# Patient Record
Sex: MALE | Race: BLACK OR AFRICAN AMERICAN | Employment: UNEMPLOYED | URBAN - METROPOLITAN AREA
[De-identification: names, ages, dates, MRNs, and addresses within clinical notes are randomized per-mention and may not be internally consistent; named-entity substitution may affect disease eponyms.]

---

## 2019-06-06 ENCOUNTER — TELEPHONE (OUTPATIENT)
Dept: ORTHOPEDICS | Facility: CLINIC | Age: 29
End: 2019-06-06

## 2019-06-06 NOTE — TELEPHONE ENCOUNTER
[facility]    Phone Message    May a detailed message be left on voicemail: yes    Reason for Call: Other: Valerie is the Pt's WC  who was calling in to schedule an appt for the Pt. The phone call was triaged to Ortho because of a possible fracture. Per bushra Mccauley there is no medical records and images in the Pt's chart, those will need to be faxed over first before scheduling an appt. Pt was seen on 6/5/2019 at [facility] and had XRay done there too. Valerie was informed to have those records faxed over and then we can schedule the appt for the Pt. Valerie expressed understanding and will have those records faxed over to the clinic.       Action Taken: Message routed to:  [facility] ([facility] Ortho

## 2020-05-08 ENCOUNTER — TRANSCRIBE ORDERS (OUTPATIENT)
Dept: OTHER | Age: 30
End: 2020-05-08

## 2020-05-08 DIAGNOSIS — L98.9 SKIN LESION: Primary | ICD-10-CM

## 2021-02-10 ENCOUNTER — TRANSCRIBE ORDERS (OUTPATIENT)
Dept: OTHER | Age: 31
End: 2021-02-10

## 2021-02-10 DIAGNOSIS — L98.9 SKIN LESION OF CHEST WALL: Primary | ICD-10-CM

## 2021-03-11 ENCOUNTER — OFFICE VISIT (OUTPATIENT)
Dept: DERMATOLOGY | Facility: CLINIC | Age: 31
End: 2021-03-11
Attending: INTERNAL MEDICINE
Payer: COMMERCIAL

## 2021-03-11 DIAGNOSIS — L91.0 KELOID: Primary | ICD-10-CM

## 2021-03-11 DIAGNOSIS — L21.9 DERMATITIS, SEBORRHEIC: ICD-10-CM

## 2021-03-11 DIAGNOSIS — L30.9 DERMATITIS: ICD-10-CM

## 2021-03-11 PROCEDURE — 11900 INJECT SKIN LESIONS </W 7: CPT | Mod: GC | Performed by: DERMATOLOGY

## 2021-03-11 PROCEDURE — 99203 OFFICE O/P NEW LOW 30 MIN: CPT | Mod: 25 | Performed by: DERMATOLOGY

## 2021-03-11 RX ORDER — DESONIDE 0.5 MG/G
OINTMENT TOPICAL
Qty: 15 G | Refills: 5 | Status: SHIPPED | OUTPATIENT
Start: 2021-03-11

## 2021-03-11 RX ORDER — KETOCONAZOLE 20 MG/ML
SHAMPOO TOPICAL
Qty: 120 ML | Refills: 5 | Status: SHIPPED | OUTPATIENT
Start: 2021-03-11

## 2021-03-11 ASSESSMENT — PAIN SCALES - GENERAL: PAINLEVEL: NO PAIN (0)

## 2021-03-11 NOTE — PROGRESS NOTES
Drug Administration Record    Prior to injection, verified patient identity using patient's name and date of birth.  Due to injection administration, patient instructed to remain in clinic for 15 minutes  afterwards, and to report any adverse reaction to me immediately.    Drug Name: triamcinolone acetonide(kenalog)  Dose: 1 mL of triamcinolone 40mg/mL, 40 mg dose  Route administered: ID  NDC #: Kenalog-40 ([address]-3)  Amount of waste(mL): 0  Reason for waste: As per MD    LOT #: [id number]  SITE: Chest  : [email or web address]  EXPIRATION DATE: 10/2022

## 2021-04-05 PROBLEM — L91.0 KELOID: Status: ACTIVE | Noted: 2021-04-05

## 2021-04-05 PROBLEM — L21.9 DERMATITIS, SEBORRHEIC: Status: ACTIVE | Noted: 2021-04-05

## 2021-04-29 ENCOUNTER — OFFICE VISIT (OUTPATIENT)
Dept: DERMATOLOGY | Facility: CLINIC | Age: 31
End: 2021-04-29
Payer: COMMERCIAL

## 2021-04-29 DIAGNOSIS — L91.0 KELOID: Primary | ICD-10-CM

## 2021-04-29 PROCEDURE — 11900 INJECT SKIN LESIONS </W 7: CPT | Performed by: DERMATOLOGY

## 2021-04-29 ASSESSMENT — PAIN SCALES - GENERAL: PAINLEVEL: NO PAIN (0)

## 2021-04-29 NOTE — PROGRESS NOTES
Armin Thompsonnoelle was seen and evaluated by myself with the medical student recording the encounter acting as scribe.  I have reviewed the scribed note for completeness and accuracy and made appropriate corrections and amendments. I performed the injections.     Derrell QUINTEROS

## 2021-04-29 NOTE — LETTER
4/29/2021       RE: Armin Rizo  [id number]  [place] [address]     Dear Colleague,    Thank you for referring your patient, Armin Rizo, to the [facility]MAT[facility] [place] [facility]. Please see a copy of my visit note below.    [facility] Dermatology Note  Encounter Date: Apr 29, 2021  Office Visit     Dermatology Problem List:  1. Keloid (suspected), right chest  - s/p ILK-40 3/11/21  - s/p ILK-20 4/29/21  ____________________________________________    Assessment & Plan:    # Keloid, right chest- improved over previously, decrease in height noted, with persistence of several nodular areas  * chronic, uncomplicated  - Explained nature of disease and treatment options  - ILK-20 injection (see procedure note)    Procedures Performed:   - Intra-lesional triamcinolone procedure note. After positioning and cleansing with isopropyl alcohol, 0.5 total mL of triamcinolone 20 mg/mL was injected into 1 lesion(s) on the R chest. The patient tolerated the procedure well and left the dermatology clinic in good condition.  - Procedure(s) performed by faculty with medical student participation.     Follow-up: prn for new or changing lesions    Staff and Medical Student:     Patient was seen and staffed with Dr. Joseph.    Jet Jeter, MS3  [employer]    ***  ____________________________________________    CC: Derm Problem (Armin is here today for a kenalog injection )    HPI:  Mr. Armin Rizo is a(n) 31 year old male who presents today for follow-up  for keloids of the R chest. He was last seen 3/11/21 by Dr. Campbell and received ILK-40 injections to two lesions in the area. He feels that these two lesions are now flatter than previously, and he is happy with the results. He notes persistence of two nodular areas within the keloid. He states that he would again be  interested in ILK injections today if warranted. He had also been seen for penile irritation at last visit, and he states that desonide 0.05% ointment is sufficient for control of symptoms. He also states that ketoconazole 2% shampoo has been sufficient in controlling symptoms related to seborrheic dermatitis noted at last visit, and he has no concerns in relation to this condition at this visit.     Patient is otherwise feeling well, without additional skin concerns.    Labs:  None reviewed.    Physical Exam:  Vitals: There were no vitals taken for this visit.  SKIN: Focused examination of chest was performed.  - Hyper- and hypo-pigmented patch R chest with several nodular areas, overall decreased in volume in comparison to last visit  - Hyper- and hypo-pigmented macule near R supero-medially to R areola  - Discrete minimally atrophic hypopigmented scar-like circular plaques: abdomen  - No other lesions of concern on areas examined.     Medications:  Current Outpatient Medications   Medication     desonide (DESOWEN) 0.05 % external ointment     diphenhydrAMINE (BENADRYL) 25 MG tablet     diphenhydrAMINE (BENADRYL) 25 MG tablet     ketoconazole (NIZORAL) 2 % external shampoo     hydrocortisone (ANUSOL-HC) 2.5 % rectal cream     No current facility-administered medications for this visit.       Past Medical History:   Patient Active Problem List   Diagnosis     Hemorrhoids, unspecified hemorrhoid type     Keloid     Dermatitis, seborrheic     History reviewed. No pertinent past medical history.     CC Referred Self, MD  No address on file on close of this encounter.      Armin Abigail Rizo was seen and evaluated by myself with the medical student recording the encounter acting as scribe.  I have reviewed the scribed note for completeness and accuracy and made appropriate corrections and amendments. I performed the injections.     Derrell QUINTEROS

## 2021-04-29 NOTE — NURSING NOTE
Dermatology Rooming Note    Armin Rizo's goals for this visit include:   Chief Complaint   Patient presents with     Derm Problem     Armin is here today for a kenalog injection      MAURICE Peterson

## 2021-04-29 NOTE — PROGRESS NOTES
[facility] Dermatology Note  Encounter Date: Apr 29, 2021  Office Visit     Dermatology Problem List:  1. Keloid (suspected), right chest  - s/p ILK-40 3/11/21  - s/p ILK-20 4/29/21  ____________________________________________    Assessment & Plan:    # Keloid, right chest- improved over previously, decrease in height noted, with persistence of several nodular areas  * chronic, uncomplicated  - Explained nature of disease and treatment options  - ILK-20 injection (see procedure note)    Procedures Performed:   - Intra-lesional triamcinolone procedure note. After positioning and cleansing with isopropyl alcohol, 0.5 total mL of triamcinolone 20 mg/mL was injected into 1 lesion(s) on the R chest. The patient tolerated the procedure well and left the dermatology clinic in good condition.  - Procedure(s) performed by faculty with medical student participation.     Follow-up: prn for new or changing lesions    Staff and Medical Student:     Patient was seen and staffed with Dr. Joseph.    Jet Jeter, MS3  [facility] [employer]  ____________________________________________    CC: Derm Problem (Armin is here today for a kenalog injection )    HPI:  Mr. Armin Rizo is a(n) 31 year old male who presents today for follow-up  for keloids of the R chest. He was last seen 3/11/21 by Dr. Campbell and received ILK-40 injections to two lesions in the area. He feels that these two lesions are now flatter than previously, and he is happy with the results. He notes persistence of two nodular areas within the keloid. He states that he would again be interested in ILK injections today if warranted. He had also been seen for penile irritation at last visit, and he states that desonide 0.05% ointment is sufficient for control of symptoms. He also states that ketoconazole 2% shampoo has been sufficient in controlling symptoms related to seborrheic dermatitis noted at last visit, and he has  no concerns in relation to this condition at this visit.     Patient is otherwise feeling well, without additional skin concerns.    Labs:  None reviewed.    Physical Exam:  Vitals: There were no vitals taken for this visit.  SKIN: Focused examination of chest was performed.  - Hyper- and hypo-pigmented patch R chest with several nodular areas, overall decreased in volume in comparison to last visit  - Hyper- and hypo-pigmented macule near R supero-medially to R areola  - Discrete minimally atrophic hypopigmented scar-like circular plaques: abdomen  - No other lesions of concern on areas examined.     Medications:  Current Outpatient Medications   Medication     desonide (DESOWEN) 0.05 % external ointment     diphenhydrAMINE (BENADRYL) 25 MG tablet     diphenhydrAMINE (BENADRYL) 25 MG tablet     ketoconazole (NIZORAL) 2 % external shampoo     hydrocortisone (ANUSOL-HC) 2.5 % rectal cream     No current facility-administered medications for this visit.       Past Medical History:   Patient Active Problem List   Diagnosis     Hemorrhoids, unspecified hemorrhoid type     Keloid     Dermatitis, seborrheic     History reviewed. No pertinent past medical history.     CC Referred Self, MD  No address on file on close of this encounter.

## (undated) RX ORDER — TRIAMCINOLONE ACETONIDE 40 MG/ML
INJECTION, SUSPENSION INTRA-ARTICULAR; INTRAMUSCULAR
Status: DISPENSED
Start: 2021-04-29

## (undated) RX ORDER — TRIAMCINOLONE ACETONIDE 40 MG/ML
INJECTION, SUSPENSION INTRA-ARTICULAR; INTRAMUSCULAR
Status: DISPENSED
Start: 2021-03-11